# Patient Record
(demographics unavailable — no encounter records)

---

## 2024-10-17 NOTE — HISTORY OF PRESENT ILLNESS
[TextBox_4] : Patient is a 25 yo female here today for annual visit. Was placed on Herminia by dr. sherman for birth control but stopped due to side effects. she states she did well on junel in the past. periods are regular coming monthly. currently sexually active looking for OCP options. desires junel  she states she had RLQ pelvic pain last month but now resolved.  hx of abnormal pap, with a LSIL COLPO last year   . 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 She is currently taking benztropine 0.5 mg BID, divalproex 500 mg at bedtime & haldol 5 mg at bedtime.    works at [employer]

## 2024-10-17 NOTE — PLAN
[FreeTextEntry1] : Patient to follow up in 1 year for annual GYN exam Mammogram and bilateral breast US due: 40  Colonoscopy due:  45  Bone density due:  pm   Rx for junel 1/20 sent, she is to call me if she has BTB or side effects after 3 months. she is to start OCPs on sunday and use back up method of birth control for 2 weeks.  plan TVS if her pelvic pain comes back    Pap ordered Gc Chlamydia ordered All questions answered, patient agreeable with plan.